# Patient Record
Sex: MALE | Race: OTHER | NOT HISPANIC OR LATINO
[De-identification: names, ages, dates, MRNs, and addresses within clinical notes are randomized per-mention and may not be internally consistent; named-entity substitution may affect disease eponyms.]

---

## 2020-07-27 DIAGNOSIS — Z82.49 FAMILY HISTORY OF ISCHEMIC HEART DISEASE AND OTHER DISEASES OF THE CIRCULATORY SYSTEM: ICD-10-CM

## 2020-07-27 DIAGNOSIS — F17.200 NICOTINE DEPENDENCE, UNSPECIFIED, UNCOMPLICATED: ICD-10-CM

## 2020-07-27 PROBLEM — Z00.00 ENCOUNTER FOR PREVENTIVE HEALTH EXAMINATION: Status: ACTIVE | Noted: 2020-07-27

## 2020-07-28 ENCOUNTER — APPOINTMENT (OUTPATIENT)
Dept: OTOLARYNGOLOGY | Facility: CLINIC | Age: 35
End: 2020-07-28
Payer: COMMERCIAL

## 2020-07-28 VITALS — WEIGHT: 190 LBS | HEIGHT: 73 IN | BODY MASS INDEX: 25.18 KG/M2

## 2020-07-28 DIAGNOSIS — H60.311 DIFFUSE OTITIS EXTERNA, RIGHT EAR: ICD-10-CM

## 2020-07-28 DIAGNOSIS — R01.1 CARDIAC MURMUR, UNSPECIFIED: ICD-10-CM

## 2020-07-28 DIAGNOSIS — H60.312 DIFFUSE OTITIS EXTERNA, LEFT EAR: ICD-10-CM

## 2020-07-28 PROCEDURE — 92504 EAR MICROSCOPY EXAMINATION: CPT

## 2020-07-28 PROCEDURE — 99203 OFFICE O/P NEW LOW 30 MIN: CPT | Mod: 25

## 2020-07-28 RX ORDER — OFLOXACIN OTIC 3 MG/ML
0.3 SOLUTION AURICULAR (OTIC) TWICE DAILY
Qty: 1 | Refills: 0 | Status: ACTIVE | COMMUNITY
Start: 2020-07-28 | End: 1900-01-01

## 2020-07-28 NOTE — PHYSICAL EXAM
[Midline] : trachea located in midline position [Normal] : no rashes [FreeTextEntry1] : Procedure: Microscopic Ear Exam\par \par Left ear:  \par Mild inflammation of the ear canal. Mild cerumen/keratin retention with pushup suggesting the use of cotton swabs deeply in the ear canal. The tympanic membrane is intact and noninflamed.\par \par \par Right ear:  \par Mild inflammation of the ear canal. Mild cerumen/keratin retention with pushup suggesting the use of cotton swabs deeply in the ear canal. The tympanic membrane is intact and noninflamed.

## 2020-07-28 NOTE — ASSESSMENT
[FreeTextEntry1] : Bilateral otitis externa possibly related to ear canal cleaning habits. Also likely effected by the hot weather. I have reviewed this with him in detail. I reviewed ear hygiene with him. I have recommended the use of otic drops. Early followup if symptoms persist.

## 2020-07-28 NOTE — HISTORY OF PRESENT ILLNESS
[de-identified] : SARAHI BERNAL has a history of bilatereal ear otorrhea for 2 weeks.  Patient here today c/o otorrhea of right ear. Patient admits it has been going on for a few weeks. He has been using peroxide with no improvement. Patient admits otalgia. Jaw pain. No hearing loss. No h/o ear infections in the past.

## 2022-12-16 ENCOUNTER — EMERGENCY (EMERGENCY)
Facility: HOSPITAL | Age: 37
LOS: 0 days | Discharge: HOME | End: 2022-12-17
Attending: EMERGENCY MEDICINE | Admitting: EMERGENCY MEDICINE

## 2022-12-16 VITALS
WEIGHT: 195.11 LBS | DIASTOLIC BLOOD PRESSURE: 66 MMHG | RESPIRATION RATE: 18 BRPM | SYSTOLIC BLOOD PRESSURE: 113 MMHG | HEART RATE: 82 BPM | OXYGEN SATURATION: 98 % | TEMPERATURE: 98 F

## 2022-12-16 DIAGNOSIS — R05.9 COUGH, UNSPECIFIED: ICD-10-CM

## 2022-12-16 DIAGNOSIS — J02.9 ACUTE PHARYNGITIS, UNSPECIFIED: ICD-10-CM

## 2022-12-16 DIAGNOSIS — R19.7 DIARRHEA, UNSPECIFIED: ICD-10-CM

## 2022-12-16 DIAGNOSIS — K64.4 RESIDUAL HEMORRHOIDAL SKIN TAGS: ICD-10-CM

## 2022-12-16 DIAGNOSIS — Z20.822 CONTACT WITH AND (SUSPECTED) EXPOSURE TO COVID-19: ICD-10-CM

## 2022-12-16 DIAGNOSIS — R11.2 NAUSEA WITH VOMITING, UNSPECIFIED: ICD-10-CM

## 2022-12-16 LAB
ALBUMIN SERPL ELPH-MCNC: 4.9 G/DL — SIGNIFICANT CHANGE UP (ref 3.5–5.2)
ALP SERPL-CCNC: 80 U/L — SIGNIFICANT CHANGE UP (ref 30–115)
ALT FLD-CCNC: 61 U/L — HIGH (ref 0–41)
ANION GAP SERPL CALC-SCNC: 15 MMOL/L — HIGH (ref 7–14)
AST SERPL-CCNC: 49 U/L — HIGH (ref 0–41)
BILIRUB SERPL-MCNC: 0.9 MG/DL — SIGNIFICANT CHANGE UP (ref 0.2–1.2)
BUN SERPL-MCNC: 14 MG/DL — SIGNIFICANT CHANGE UP (ref 10–20)
CALCIUM SERPL-MCNC: 9.1 MG/DL — SIGNIFICANT CHANGE UP (ref 8.4–10.5)
CHLORIDE SERPL-SCNC: 99 MMOL/L — SIGNIFICANT CHANGE UP (ref 98–110)
CO2 SERPL-SCNC: 24 MMOL/L — SIGNIFICANT CHANGE UP (ref 17–32)
CREAT SERPL-MCNC: 1.4 MG/DL — SIGNIFICANT CHANGE UP (ref 0.7–1.5)
EGFR: 66 ML/MIN/1.73M2 — SIGNIFICANT CHANGE UP
FLUAV AG NPH QL: SIGNIFICANT CHANGE UP
FLUBV AG NPH QL: SIGNIFICANT CHANGE UP
GLUCOSE SERPL-MCNC: 87 MG/DL — SIGNIFICANT CHANGE UP (ref 70–99)
HCT VFR BLD CALC: 43.4 % — SIGNIFICANT CHANGE UP (ref 42–52)
HGB BLD-MCNC: 15.3 G/DL — SIGNIFICANT CHANGE UP (ref 14–18)
LIDOCAIN IGE QN: 29 U/L — SIGNIFICANT CHANGE UP (ref 7–60)
MAGNESIUM SERPL-MCNC: 2.1 MG/DL — SIGNIFICANT CHANGE UP (ref 1.8–2.4)
MCHC RBC-ENTMCNC: 31.3 PG — HIGH (ref 27–31)
MCHC RBC-ENTMCNC: 35.3 G/DL — SIGNIFICANT CHANGE UP (ref 32–37)
MCV RBC AUTO: 88.8 FL — SIGNIFICANT CHANGE UP (ref 80–94)
NRBC # BLD: 0 /100 WBCS — SIGNIFICANT CHANGE UP (ref 0–0)
PLATELET # BLD AUTO: 258 K/UL — SIGNIFICANT CHANGE UP (ref 130–400)
POTASSIUM SERPL-MCNC: 3.9 MMOL/L — SIGNIFICANT CHANGE UP (ref 3.5–5)
POTASSIUM SERPL-SCNC: 3.9 MMOL/L — SIGNIFICANT CHANGE UP (ref 3.5–5)
PROT SERPL-MCNC: 7.5 G/DL — SIGNIFICANT CHANGE UP (ref 6–8)
RBC # BLD: 4.89 M/UL — SIGNIFICANT CHANGE UP (ref 4.7–6.1)
RBC # FLD: 11.9 % — SIGNIFICANT CHANGE UP (ref 11.5–14.5)
RSV RNA NPH QL NAA+NON-PROBE: SIGNIFICANT CHANGE UP
SARS-COV-2 RNA SPEC QL NAA+PROBE: SIGNIFICANT CHANGE UP
SODIUM SERPL-SCNC: 138 MMOL/L — SIGNIFICANT CHANGE UP (ref 135–146)
WBC # BLD: 10.1 K/UL — SIGNIFICANT CHANGE UP (ref 4.8–10.8)
WBC # FLD AUTO: 10.1 K/UL — SIGNIFICANT CHANGE UP (ref 4.8–10.8)

## 2022-12-16 PROCEDURE — 99285 EMERGENCY DEPT VISIT HI MDM: CPT

## 2022-12-16 PROCEDURE — 71046 X-RAY EXAM CHEST 2 VIEWS: CPT | Mod: 26

## 2022-12-16 PROCEDURE — 93010 ELECTROCARDIOGRAM REPORT: CPT

## 2022-12-16 RX ORDER — ACETAMINOPHEN 500 MG
975 TABLET ORAL ONCE
Refills: 0 | Status: COMPLETED | OUTPATIENT
Start: 2022-12-16 | End: 2022-12-16

## 2022-12-16 RX ORDER — SODIUM CHLORIDE 9 MG/ML
1000 INJECTION INTRAMUSCULAR; INTRAVENOUS; SUBCUTANEOUS ONCE
Refills: 0 | Status: COMPLETED | OUTPATIENT
Start: 2022-12-16 | End: 2022-12-16

## 2022-12-16 RX ORDER — DEXAMETHASONE 0.5 MG/5ML
10 ELIXIR ORAL ONCE
Refills: 0 | Status: COMPLETED | OUTPATIENT
Start: 2022-12-16 | End: 2022-12-16

## 2022-12-16 RX ORDER — IPRATROPIUM/ALBUTEROL SULFATE 18-103MCG
3 AEROSOL WITH ADAPTER (GRAM) INHALATION ONCE
Refills: 0 | Status: COMPLETED | OUTPATIENT
Start: 2022-12-16 | End: 2022-12-16

## 2022-12-16 RX ADMIN — SODIUM CHLORIDE 1000 MILLILITER(S): 9 INJECTION INTRAMUSCULAR; INTRAVENOUS; SUBCUTANEOUS at 22:25

## 2022-12-16 RX ADMIN — Medication 3 MILLILITER(S): at 22:49

## 2022-12-16 NOTE — ED PROVIDER NOTE - CARE PROVIDER_API CALL
Rosemarie Irizarry (MD)  Gastroenterology  [address] [place] [address]  Phone: [phone]  Fax: [phone]  Follow Up Time: 7-10 Days

## 2022-12-16 NOTE — ED PROVIDER NOTE - PATIENT PORTAL LINK FT
You can access the FollowMyHealth Patient Portal offered by [facility] by registering at the following website: [email or web address]. By joining [employer]’s FollowMyHealth portal, you will also be able to view your health information using other applications (apps) compatible with our system.

## 2022-12-16 NOTE — ED PROVIDER NOTE - NS ED ROS FT
Review of Systems    Constitutional: (-) fever  Cardiovascular: (-) chest pain, (-) syncope  Respiratory: As per HPI  Gastrointestinal: As per HPI  Musculoskeletal: (-) neck pain, (-) back pain, (-) joint pain  Integumentary: (-) rash, (-) edema  Neurological: (-) headache, (-) altered mental status    Except as documented in the HPI, all other systems are negative.

## 2022-12-16 NOTE — ED PROVIDER NOTE - CARE PLAN
Principal Discharge DX:	Coughing  Secondary Diagnosis:	Rectal bleed  Secondary Diagnosis:	History of nausea and vomiting   1

## 2022-12-16 NOTE — ED PROVIDER NOTE - CLINICAL SUMMARY MEDICAL DECISION MAKING FREE TEXT BOX
37-year-old male, with past medical history of hemorrhoids, presents with complaints of 1 day of sore throat with associated cough and and and vomiting phlegm.  Patient also reports around 1 episode of watery diarrhea that was bloody.  Denies chest pain, abdominal pain, fever, recent travel.  Has sick contact and father who lives with him.  VSS, non toxic appearing, NAD, Head NCAT, MMM, pharyngeal exam with some erythema, no exudates or edema, neck supple, normal ROM, normal s1s2, lungs ctab, abd s/nt/nd, no guarding or rebound, extremities wnl, AAO x 3, GCS 15, neuro grossly normal. No acute skin lesions. Plan is EKG, labs, chest x-ray, meds as needed and dispo accordingly. 37-year-old male, with past medical history of hemorrhoids, presents with complaints of 1 day of sore throat with associated cough and and and vomiting phlegm.  Patient also reports around 1 episode of watery diarrhea that was bloody.  Denies chest pain, abdominal pain, fever, recent travel.  Has sick contact and father who lives with him.  VSS, non toxic appearing, NAD, Head NCAT, MMM, pharyngeal exam with some erythema, no exudates or edema, neck supple, normal ROM, normal s1s2, lungs ctab, abd s/nt/nd, no guarding or rebound, extremities wnl, AAO x 3, GCS 15, neuro grossly normal. No acute skin lesions. Plan is EKG, labs, chest x-ray, meds as needed and dispo accordingly.    Discussed patient's lab results with him will discharge with outpatient follow-up.

## 2022-12-16 NOTE — ED PROVIDER NOTE - NSFOLLOWUPINSTRUCTIONS_ED_ALL_ED_FT
Follow up with Gastroenterology within 2 weeks    Our Emergency Department Referral Coordinators will be reaching out to you in the next 24-48 hours from 9:00am to 5:00pm with a follow up appointment. Please expect a phone call from the hospital in that time frame. If you do not receive a call or if you have any questions or concerns, you can reach them at [phone] or [phone].      Rectal Bleeding       Rectal bleeding is when blood passes out of the opening between the buttocks (anus). People with rectal bleeding may notice bright red blood in their underwear or in the toilet after having a bowel movement. They may also have blood mixed with their stool (feces), or dark red or black stools.    Rectal bleeding is usually a sign that something is wrong. Many things can cause rectal bleeding, including:  •Diverticulosis. This is a condition in which pockets or sacs project from the bowel.      •Hemorrhoids. These are blood vessels around the anus or inside the rectum that are larger than normal.      •Anal fissures. This is a tear in the anus.      •Proctitis and colitis. These are conditions in which the rectum, colon, or anus become inflamed.      •Polyps. These are growths that can be cancerous (malignant) or noncancerous (benign).      •Infections of the intestines.      •Fistulas. These are abnormal openings in the rectum and anus.      •Rectal prolapse. This is when a part of the rectum sticks out from the anus.        Follow these instructions at home:    Pay attention to any changes in your symptoms. Take these actions to help reduce bleeding and discomfort:    Medicines     •Take over-the-counter and prescription medicines only as told by your health care provider.      •Ask your health care provider about changing or stopping your regular medicines or supplements. This is especially important if you are taking blood thinners. Medicines that thin the blood can make rectal bleeding worse.        Managing constipation      Your condition may cause constipation. To prevent or treat constipation, or to help make your stools soft, you may need to:  •Drink enough fluid to keep your urine pale yellow.      •Take over-the-counter or prescription medicines.      •Eat foods that are high in fiber, such as beans, whole grains, and fresh fruits and vegetables. Ask your health care provider if you need a supplement to give you more fiber.      •Limit foods that are high in fat and processed sugars, such as fried or sweet foods.      General instructions     •Try not to strain when having a bowel movement.      •Try taking a warm bath. This may help to soothe any pain in your rectum.      •Keep all follow-up visits as told by your health care provider. This is important.        Contact a health care provider if you:    •Have pain or tenderness in your abdomen.      •Have a fever.      •Have weakness.      •Have nausea.      •Cannot have a bowel movement.        Get help right away if you have:    •New or increased rectal bleeding.      •Black or dark red stools.      •Vomit with blood or something that looks like coffee grounds.      •A fainting episode.      •Severe pain in your rectum.        Summary    •Rectal bleeding is usually a sign that something is wrong. This condition should be evaluated by a health care provider.      •Eat a diet that is high in fiber. This will help keep your stools soft, making it easier to pass stools without straining.      •Medicines that thin the blood can make rectal bleeding worse.      •Get help right away if you have new or increased rectal bleeding, black or dark red stools, blood in your vomit, an episode of fainting, or severe pain in your rectum.      This information is not intended to replace advice given to you by your health care provider. Make sure you discuss any questions you have with your health care provider.

## 2022-12-16 NOTE — ED PROVIDER NOTE - OBJECTIVE STATEMENT
37-year-old male, with past medical history of hemorrhoids, presents with complaints of 1 day of sore throat with associated cough and and and vomiting phlegm.  Patient also reports around 1 episode of watery diarrhea that was bloody.  Denies chest pain, abdominal pain, fever, recent travel.  Has sick contact and father who lives with him.  VSS, non toxic appearing, NAD, Head NCAT, MMM, pharyngeal exam with some erythema, no exudates or edema, neck supple, normal ROM, normal s1s2, lungs ctab, abd s/nt/nd, no guarding or rebound, extremities wnl, AAO x 3, GCS 15, neuro grossly normal. No acute skin lesions. Plan is EKG, labs, chest x-ray, meds as needed and dispo accordingly.

## 2022-12-16 NOTE — ED PROVIDER NOTE - PHYSICAL EXAMINATION
CONSTITUTIONAL: Well-developed; well-nourished; in no acute distress, nontoxic appearing  SKIN: skin exam is warm and dry,  HEAD: Normocephalic; atraumatic.  EYES: PERRL, 3 mm bilateral, no nystagmus, EOM intact; conjunctiva and sclera clear.  ENT: MMM, no nasal congestion  NECK: Supple; non tender.+ full passive ROM in all directions. No JVD  CARD: S1, S2 normal, no murmur  RESP: No wheezes, rales or rhonchi. Good air movement bilaterally, normal work of breathing  ABD: soft; non-distended; non-tender. No Rebound, No guarding.  exam with 1 external hemorrhoid. JUAN with brown stool, no blood  NEURO: awake, alert, following commands, oriented, grossly unremarkable. No Focal deficits. GCS 15.   PSYCH: Cooperative, appropriate.

## 2022-12-17 RX ADMIN — Medication 975 MILLIGRAM(S): at 00:55

## 2022-12-17 RX ADMIN — Medication 10 MILLIGRAM(S): at 00:56

## 2024-01-26 ENCOUNTER — NON-APPOINTMENT (OUTPATIENT)
Age: 39
End: 2024-01-26

## 2024-05-30 ENCOUNTER — APPOINTMENT (OUTPATIENT)
Dept: ORTHOPEDIC SURGERY | Facility: CLINIC | Age: 39
End: 2024-05-30
Payer: COMMERCIAL

## 2024-05-30 DIAGNOSIS — M70.22 OLECRANON BURSITIS, LEFT ELBOW: ICD-10-CM

## 2024-05-30 PROCEDURE — 73080 X-RAY EXAM OF ELBOW: CPT | Mod: LT

## 2024-05-30 PROCEDURE — 99203 OFFICE O/P NEW LOW 30 MIN: CPT | Mod: 25

## 2024-05-30 PROCEDURE — 20605 DRAIN/INJ JOINT/BURSA W/O US: CPT | Mod: LT

## 2024-05-30 NOTE — DISCUSSION/SUMMARY
[de-identified] : Patient has olecranon bursitis of the left elbow.  Today, I aspirated the bursitis using sterile technique an 18-gauge needle, approximately 2 cc of fluid was drained that was blood.  Patient tolerated this procedure well. I educated patient about olecranon bursitis, and that after draining this, cannot be drained for at least another month.  Patient complains of swelling earlier than 4 weeks, it will not be drained.  Patient was also strongly encouraged to keep compression on this elbow starting today for 24 hours.  Patient can only take off the compression for showering and should immediately put it back on.  Patient should do this for approximately 2 weeks, then can gradually start to wean off the compression, a couple of hours at a time.  I wrapped patient's elbow in an Ace bandage.  Patient understood the directions.  He will follow-up in approximately 5 to 6 weeks for further assessment and very aspiration if needed.  Motrin as needed for pain and inflammation.  Patient agrees to above plan all questions were answered today

## 2024-05-30 NOTE — DATA REVIEWED
[FreeTextEntry1] : X-ray images were obtained at the office today.  AP, lateral, oblique views of the left elbow reveal no acute fractures, dislocations, bony abnormalities

## 2024-05-30 NOTE — HISTORY OF PRESENT ILLNESS
[de-identified] : 39-year-old male presents for left elbow pain and swelling.  Approximately week ago patient started noticing pain in his elbow and 2 or 3 days ago he started noticed swelling in the elbow that feels like fluid in the elbow.  Patient denies any past injuries or surgeries to the elbow.  No inciting event that started the pain and swelling.  Patient is left-hand dominant.  Has not done anything for pain relief.  Pain is only with direct contact.

## 2024-05-30 NOTE — PHYSICAL EXAM
[de-identified] : Physical exam of the left elbow: -No erythema, ecchymosis, warmth to touch. Swelling of elbow.  Skin intact. -Tenderness over the olecranon.  -Full ROM of elbow with some discomfort -Radial pulse +2, sensation intact to light touch

## 2024-06-03 ENCOUNTER — EMERGENCY (EMERGENCY)
Facility: HOSPITAL | Age: 39
LOS: 0 days | Discharge: HOME | End: 2024-06-03
Attending: EMERGENCY MEDICINE
Payer: COMMERCIAL

## 2024-06-03 VITALS
OXYGEN SATURATION: 96 % | SYSTOLIC BLOOD PRESSURE: 114 MMHG | HEART RATE: 58 BPM | TEMPERATURE: 98 F | RESPIRATION RATE: 18 BRPM | DIASTOLIC BLOOD PRESSURE: 60 MMHG | HEIGHT: 73 IN | WEIGHT: 195.11 LBS

## 2024-06-03 DIAGNOSIS — M70.32 OTHER BURSITIS OF ELBOW, LEFT ELBOW: ICD-10-CM

## 2024-06-03 DIAGNOSIS — M70.22 OLECRANON BURSITIS, LEFT ELBOW: ICD-10-CM

## 2024-06-03 PROCEDURE — 99283 EMERGENCY DEPT VISIT LOW MDM: CPT

## 2024-06-03 PROCEDURE — 99282 EMERGENCY DEPT VISIT SF MDM: CPT

## 2024-06-03 NOTE — ED PROVIDER NOTE - CARE PROVIDER_API CALL
[place]  Orthopaedic Surgery  [address] [facility] [place] [address]  Phone: [phone]  Fax: [phone]  Follow Up Time: 7-10 Days

## 2024-06-03 NOTE — ED PROVIDER NOTE - PATIENT PORTAL LINK FT
You can access the FollowMyHealth Patient Portal offered by [facility] by registering at the following website: [email or web address]. By joining [employer]’s FollowMyHealth portal, you will also be able to view your health information using other applications (apps) compatible with our system.

## 2024-06-03 NOTE — ED PROVIDER NOTE - CLINICAL SUMMARY MEDICAL DECISION MAKING FREE TEXT BOX
39-year-old male no past medical history presents with left elbow swelling since Tuesday.  Seen at [address] [place] [address] Ortho clinic and had elbow drained on Thursday.  States swelling came back over the weekend and now it is worse.  No redness fever warmth.  No trauma.  No increased pain.  No numbness weakness.  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    On exam, AFVSS, Well appearing, No acute distress, NCAT, EOMI, PERRLA, MMM, Neck supple, left elbow olecranon bursitis, soft nontender, no erythema or warmth, full range of motion active and passive of left elbow, 5 out of 5 motor, sensation intact light touch, 2+ radial pulse, AAOx3, No Focal Deficits, No LE edema or calf TTP,    A/P; olecranon bursitis, no sign of infection, neurovascularly intact, DC home follow-up with Ortho as outpatient 1 to 2 weeks, strict return precautions

## 2024-06-03 NOTE — ED PROVIDER NOTE - OBJECTIVE STATEMENT
39-year-old male with no PMH presenting for left elbow bursitis.  Patient reports early last week he started having swelling of the elbow and pain.  Went to walk-in Ortho clinic at [address] [facility] [place] on 5/30, was diagnosed with bursitis and had it drained.  Patient states the next day he did not have return of swelling or pain however over the weekend he has had increasing swelling and today swelling is as bad as it was last week.  Patient denies fever, warmth, redness to the elbow.

## 2024-06-03 NOTE — ED ADULT TRIAGE NOTE - WEIGHT IN LBS
Call transferred by CSS: Patient informed of results (identified name and ) and recommendations, as per Dr. Samreen Hooper result note. Patient voiced understanding and agrees with recommendations. eRx sent; lab orders generated.       Notes Recorded by Tarsha Silveira 195.1

## 2024-06-03 NOTE — ED ADULT NURSE NOTE - OBJECTIVE STATEMENT
Pt presents to the ED c/o left elbow swelling. Pt was here last week and had it drained and states that it doubled in size the next day.

## 2024-06-03 NOTE — ED PROVIDER NOTE - ATTENDING CONTRIBUTION TO CARE
39-year-old male no past medical history presents with left elbow swelling since Tuesday.  Seen at [address] [place] [address] Ortho clinic and had elbow drained on Thursday.  States swelling came back over the weekend and now it is worse.  No redness fever warmth.  No trauma.  No increased pain.  No numbness weakness.  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    On exam, AFVSS, Well appearing, No acute distress, NCAT, EOMI, PERRLA, MMM, Neck supple, left elbow olecranon bursitis, soft nontender, no erythema or warmth, full range of motion active and passive of left elbow, 5 out of 5 motor, sensation intact light touch, 2+ radial pulse, AAOx3, No Focal Deficits, No LE edema or calf TTP,    A/P; olecranon bursitis, no sign of infection, neurovascularly intact, DC home follow-up with Ortho as outpatient 1 to 2 weeks, strict return precautions

## 2024-06-03 NOTE — ED ADULT TRIAGE NOTE - WEIGHT METHOD
Endoscopy recovery  Patient returned to baseline, vital signs stable (see vital sign flowsheet). Patient offered liquids and tolerated well. Respiratory status within defined limits. Abdomen soft not tender. Skin with in defined limits. Responsible party driving patient home was given the opportunity to ask questions. Patient discharged with documented belongings.
stated

## 2024-06-03 NOTE — ED PROVIDER NOTE - PHYSICAL EXAMINATION
CONSTITUTIONAL: No acute distress.   SKIN: Warm, dry  HEAD: Normocephalic; atraumatic  ENT: No nasal discharge; airway clear.  EXT: Normal ROM. Left elbow bursitis, no warmth or erythema, FROM. 2+ radial pulse, sensation intact, normal capillary refill of LUE.  NEURO: Alert, oriented, grossly unremarkable  PSYCH: Cooperative, appropriate.

## 2024-06-03 NOTE — ED ADULT TRIAGE NOTE - CHIEF COMPLAINT QUOTE
pt c/o left elbow pain and swelling. pt reports having joint drained on thursday, and it was double the size the next day

## 2024-07-15 ENCOUNTER — APPOINTMENT (OUTPATIENT)
Dept: ORTHOPEDIC SURGERY | Facility: CLINIC | Age: 39
End: 2024-07-15

## 2025-03-04 ENCOUNTER — NON-APPOINTMENT (OUTPATIENT)
Age: 40
End: 2025-03-04